# Patient Record
Sex: MALE | Race: OTHER | HISPANIC OR LATINO | URBAN - METROPOLITAN AREA
[De-identification: names, ages, dates, MRNs, and addresses within clinical notes are randomized per-mention and may not be internally consistent; named-entity substitution may affect disease eponyms.]

---

## 2019-07-11 ENCOUNTER — EMERGENCY (EMERGENCY)
Facility: HOSPITAL | Age: 24
LOS: 1 days | Discharge: HOME | End: 2019-07-11
Attending: EMERGENCY MEDICINE
Payer: COMMERCIAL

## 2019-07-11 VITALS
RESPIRATION RATE: 18 BRPM | DIASTOLIC BLOOD PRESSURE: 81 MMHG | OXYGEN SATURATION: 99 % | HEART RATE: 71 BPM | SYSTOLIC BLOOD PRESSURE: 134 MMHG

## 2019-07-11 VITALS
WEIGHT: 182.1 LBS | SYSTOLIC BLOOD PRESSURE: 166 MMHG | DIASTOLIC BLOOD PRESSURE: 111 MMHG | RESPIRATION RATE: 18 BRPM | HEIGHT: 67 IN | HEART RATE: 77 BPM

## 2019-07-11 LAB
ALBUMIN SERPL ELPH-MCNC: 4.6 G/DL — SIGNIFICANT CHANGE UP (ref 3.3–5.2)
ALP SERPL-CCNC: 82 U/L — SIGNIFICANT CHANGE UP (ref 40–120)
ALT FLD-CCNC: 97 U/L — HIGH
ANION GAP SERPL CALC-SCNC: 14 MMOL/L — SIGNIFICANT CHANGE UP (ref 5–17)
AST SERPL-CCNC: 45 U/L — HIGH
BASOPHILS # BLD AUTO: 0.1 K/UL — SIGNIFICANT CHANGE UP (ref 0–0.2)
BASOPHILS NFR BLD AUTO: 1.3 % — SIGNIFICANT CHANGE UP (ref 0–2)
BILIRUB SERPL-MCNC: 0.3 MG/DL — LOW (ref 0.4–2)
BLD GP AB SCN SERPL QL: SIGNIFICANT CHANGE UP
BUN SERPL-MCNC: 16 MG/DL — SIGNIFICANT CHANGE UP (ref 8–20)
CALCIUM SERPL-MCNC: 9.5 MG/DL — SIGNIFICANT CHANGE UP (ref 8.6–10.2)
CHLORIDE SERPL-SCNC: 102 MMOL/L — SIGNIFICANT CHANGE UP (ref 98–107)
CO2 SERPL-SCNC: 23 MMOL/L — SIGNIFICANT CHANGE UP (ref 22–29)
CREAT SERPL-MCNC: 0.64 MG/DL — SIGNIFICANT CHANGE UP (ref 0.5–1.3)
EOSINOPHIL # BLD AUTO: 0.17 K/UL — SIGNIFICANT CHANGE UP (ref 0–0.5)
EOSINOPHIL NFR BLD AUTO: 2.2 % — SIGNIFICANT CHANGE UP (ref 0–6)
GLUCOSE SERPL-MCNC: 96 MG/DL — SIGNIFICANT CHANGE UP (ref 70–115)
HCT VFR BLD CALC: 44.7 % — SIGNIFICANT CHANGE UP (ref 39–50)
HGB BLD-MCNC: 14.7 G/DL — SIGNIFICANT CHANGE UP (ref 13–17)
IMM GRANULOCYTES NFR BLD AUTO: 0.4 % — SIGNIFICANT CHANGE UP (ref 0–1.5)
LYMPHOCYTES # BLD AUTO: 2.66 K/UL — SIGNIFICANT CHANGE UP (ref 1–3.3)
LYMPHOCYTES # BLD AUTO: 35.1 % — SIGNIFICANT CHANGE UP (ref 13–44)
MCHC RBC-ENTMCNC: 27.2 PG — SIGNIFICANT CHANGE UP (ref 27–34)
MCHC RBC-ENTMCNC: 32.9 GM/DL — SIGNIFICANT CHANGE UP (ref 32–36)
MCV RBC AUTO: 82.6 FL — SIGNIFICANT CHANGE UP (ref 80–100)
MONOCYTES # BLD AUTO: 0.66 K/UL — SIGNIFICANT CHANGE UP (ref 0–0.9)
MONOCYTES NFR BLD AUTO: 8.7 % — SIGNIFICANT CHANGE UP (ref 2–14)
NEUTROPHILS # BLD AUTO: 3.95 K/UL — SIGNIFICANT CHANGE UP (ref 1.8–7.4)
NEUTROPHILS NFR BLD AUTO: 52.3 % — SIGNIFICANT CHANGE UP (ref 43–77)
PLATELET # BLD AUTO: 262 K/UL — SIGNIFICANT CHANGE UP (ref 150–400)
POTASSIUM SERPL-MCNC: 3.7 MMOL/L — SIGNIFICANT CHANGE UP (ref 3.5–5.3)
POTASSIUM SERPL-SCNC: 3.7 MMOL/L — SIGNIFICANT CHANGE UP (ref 3.5–5.3)
PROT SERPL-MCNC: 7.8 G/DL — SIGNIFICANT CHANGE UP (ref 6.6–8.7)
RBC # BLD: 5.41 M/UL — SIGNIFICANT CHANGE UP (ref 4.2–5.8)
RBC # FLD: 13.2 % — SIGNIFICANT CHANGE UP (ref 10.3–14.5)
SODIUM SERPL-SCNC: 139 MMOL/L — SIGNIFICANT CHANGE UP (ref 135–145)
WBC # BLD: 7.57 K/UL — SIGNIFICANT CHANGE UP (ref 3.8–10.5)
WBC # FLD AUTO: 7.57 K/UL — SIGNIFICANT CHANGE UP (ref 3.8–10.5)

## 2019-07-11 PROCEDURE — 86901 BLOOD TYPING SEROLOGIC RH(D): CPT

## 2019-07-11 PROCEDURE — 80053 COMPREHEN METABOLIC PANEL: CPT

## 2019-07-11 PROCEDURE — 73140 X-RAY EXAM OF FINGER(S): CPT

## 2019-07-11 PROCEDURE — 73140 X-RAY EXAM OF FINGER(S): CPT | Mod: 26,RT

## 2019-07-11 PROCEDURE — 85730 THROMBOPLASTIN TIME PARTIAL: CPT

## 2019-07-11 PROCEDURE — T1013: CPT

## 2019-07-11 PROCEDURE — 96365 THER/PROPH/DIAG IV INF INIT: CPT

## 2019-07-11 PROCEDURE — 36415 COLL VENOUS BLD VENIPUNCTURE: CPT

## 2019-07-11 PROCEDURE — 86850 RBC ANTIBODY SCREEN: CPT

## 2019-07-11 PROCEDURE — 86900 BLOOD TYPING SEROLOGIC ABO: CPT

## 2019-07-11 PROCEDURE — 85610 PROTHROMBIN TIME: CPT

## 2019-07-11 PROCEDURE — 99284 EMERGENCY DEPT VISIT MOD MDM: CPT | Mod: 25

## 2019-07-11 PROCEDURE — 64450 NJX AA&/STRD OTHER PN/BRANCH: CPT

## 2019-07-11 PROCEDURE — 85027 COMPLETE CBC AUTOMATED: CPT

## 2019-07-11 RX ORDER — CEFAZOLIN SODIUM 1 G
1000 VIAL (EA) INJECTION ONCE
Refills: 0 | Status: COMPLETED | OUTPATIENT
Start: 2019-07-11 | End: 2019-07-11

## 2019-07-11 RX ORDER — CEPHALEXIN 500 MG
1 CAPSULE ORAL
Qty: 40 | Refills: 0
Start: 2019-07-11 | End: 2019-07-20

## 2019-07-11 RX ADMIN — Medication 1000 MILLIGRAM(S): at 12:03

## 2019-07-11 RX ADMIN — Medication 100 MILLIGRAM(S): at 11:33

## 2019-07-11 NOTE — ED ADULT NURSE NOTE - OBJECTIVE STATEMENT
patient states he was cleaning a machine at work and the blade cut his right second and third fingers. visualized subcutaneous fat and soft tissue exposed.

## 2019-07-11 NOTE — ED PROCEDURE NOTE - CPROC ED POST PROC CARE GUIDE1
Elevate the injured extremity as instructed./Instructed patient/caregiver to follow-up with primary care physician./Instructed patient/caregiver regarding signs and symptoms of infection./Verbal/written post procedure instructions were given to patient/caregiver.

## 2019-07-11 NOTE — ED PROVIDER NOTE - CRITICAL CARE INDICATION, MLM
patient was critically ill... Patient was critically ill with a high probability of imminent or life threatening deterioration. Concern for loss of limb

## 2019-07-11 NOTE — ED ADULT TRIAGE NOTE - CHIEF COMPLAINT QUOTE
Pt presents to ED for eval s/p hand injury while at work. Pt states that he was cleaning a machine and the blade sliced his hand. Pt with +avulsion to right hand index and middle finger. Tip of fingers missing, +bone exposure to middle finger. +active bleeding

## 2019-07-11 NOTE — ED PROVIDER NOTE - NSFOLLOWUPINSTRUCTIONS_ED_ALL_ED_FT
- Follow up with Dr Alvarez within 24 hours, call now for an appointment.   - Bring results with you to the appointment.   - Take Keflex 500mg four times a day for 10 days.   - Return to the ED for any new or worsening symptoms.

## 2019-07-11 NOTE — CONSULT NOTE ADULT - SUBJECTIVE AND OBJECTIVE BOX
Pt Name: DAGO VELASCO    MRN: [id number]      Patient is a 24y Male presenting to the emergency department with a chief complaint of right hand 2nd/3rd pain s/p getting hand stuck in machine at work. Pt works as [employer] and was using a cutting device, got both digits stuck in machine. Pt reports being able to move the digits and having sensation s/p injury. Pt reports having pain after the injury. Pt received digital block in the ED with minimal irrigation, was given IV ABX and tetanus is up to date. Pt reports no other orthopedic injuries, only digits 2 and 3 affected on the right hand.       REVIEW OF SYSTEMS    General: Alert, responsive, in NAD    Skin/Breast: No rashes, no pruritis   	  Ophthalmologic: No visual changes. No redness.   	  ENMT:	No discharge. No swelling.    Respiratory and Thorax: No difficulty breathing. No cough.  	   Cardiovascular:	No chest pain. No palpitations.    Gastrointestinal:	 No abdominal pain. No diarrhea.     Genitourinary: No dysuria. No bleeding.    Musculoskeletal: SEE HPI.    Neurological: No sensory or motor changes.     Psychiatric: No anxiety or depression.    Hematology/Lymphatics: No swelling.    Endocrine: No Hx of diabetes.    ROS is otherwise negative.    PAST MEDICAL & SURGICAL HISTORY:  PAST MEDICAL & SURGICAL HISTORY:  No pertinent past medical history  No significant past surgical history      Allergies: No Known Allergies      Medications:     FAMILY HISTORY:  : non-contributory    Social History:     Ambulation: Walking independently [ ] With Cane [ ] With Walker [ ]  Bedbound [ ]                           14.7   7.57  )-----------( 262      ( 11 Jul 2019 11:20 )             44.7       07-11    139  |  102  |  16.0  ----------------------------<  96  3.7   |  23.0  |  0.64    Ca    9.5      11 Jul 2019 11:20    TPro  7.8  /  Alb  4.6  /  TBili  0.3<L>  /  DBili  x   /  AST  45<H>  /  ALT  97<H>  /  AlkPhos  82  07-11      Vital Signs Last 24 Hrs  T(C): 36.8 (11 Jul 2019 11:02), Max: 36.8 (11 Jul 2019 11:02)  T(F): 98.2 (11 Jul 2019 11:02), Max: 98.2 (11 Jul 2019 11:02)  HR: 60 (11 Jul 2019 11:10) (60 - 77)  BP: 120/79 (11 Jul 2019 11:10) (120/79 - 166/111)  BP(mean): --  RR: 18 (11 Jul 2019 11:00) (18 - 18)  SpO2: --    Daily Height in cm: 170.18 (11 Jul 2019 11:00)    Daily       PHYSICAL EXAM:      Appearance: Alert, responsive, in no acute distress.  Neurological: Sensation is grossly intact to light touch. Patient recieved digital block in ED prior to ortho consult  Skin: no rash on visible skin. Skin is clean, dry and intact. No bleeding. No abrasions. No ulcerations.  Vascular: 2+ distal pulses, no cyanosis  Right Upper Extremity: right 2nd digit volar aspect distal phalanx diffuse soft tissue loss, no tendon or bony exposure, nail intact. 3rd digit laceration over volar aspect distal phalanx, no bony or tendon exposure. no foreign bodies noted in wounds. minimal active bleeding at 2nd/3rd digit. Full flexion/extension at all joints of 2nd/3rd digits. Rest of digits unaffected.     Imaging Studies:    Right Hand XRAYs- no obvious fracture of 2nd/3rd digits    A/P:  Pt is a  24y Male with  found to have right hand 2nd 3rd digit lacerations    PLAN:   * Washed out in ED with copious irrigation, sterile water and betadine  * Tetanus  * IV ABX  * PO Keflex x 7 days  * F/U this week  * DAYSI SURESH

## 2019-07-11 NOTE — ED PROVIDER NOTE - OBJECTIVE STATEMENT
Translation provided by ED : Jean Marie  24M no PMH p/w injury to R 2nd and 3rd digit. Was trying to remove something from machine at work. Injury occurred ~30min PTA. Pt is R handed. Last tetanus a year ago. Denies additional injuries.

## 2019-07-11 NOTE — ED ADULT NURSE NOTE - NSIMPLEMENTINTERV_GEN_ALL_ED
Implemented All Universal Safety Interventions:  [place] to call system. Call bell, personal items and telephone within reach. Instruct patient to call for assistance. Room bathroom lighting operational. Non-slip footwear when patient is off stretcher. Physically safe environment: no spills, clutter or unnecessary equipment. Stretcher in lowest position, wheels locked, appropriate side rails in place.

## 2019-07-11 NOTE — ED PROVIDER NOTE - PROGRESS NOTE DETAILS
Brianne BADILLO: Digital block to both digits, pt now pain free. Ortho/hand consulted. Brianne BADILLO: seen by ortho, no fx, washed out fingers. Ready for dc on keflex and outpt f/u with Dr Yoon

## 2019-07-11 NOTE — ED PROVIDER NOTE - PHYSICAL EXAMINATION
Gen: Well appearing in mild distress 2/2 pain  Head: NC/AT  Neck: trachea midline  Resp:  No distress, CTAB  CV: RRR  Ext: Distal R 2nd and 3rd digit with multiple lacerations, fat exposed, can probe to bone. Is bleeding, no pulsatile flow. Good sensation and cap refill.   Neuro:  A&O appears non focal  Skin:  Warm and dry as visualized  Psych:  Normal affect and mood

## 2021-03-01 ENCOUNTER — EMERGENCY (EMERGENCY)
Facility: HOSPITAL | Age: 26
LOS: 1 days | Discharge: HOME | End: 2021-03-01
Attending: STUDENT IN AN ORGANIZED HEALTH CARE EDUCATION/TRAINING PROGRAM
Payer: SELF-PAY

## 2021-03-01 VITALS
SYSTOLIC BLOOD PRESSURE: 146 MMHG | RESPIRATION RATE: 18 BRPM | WEIGHT: 179.9 LBS | HEART RATE: 111 BPM | DIASTOLIC BLOOD PRESSURE: 103 MMHG | TEMPERATURE: 98 F | OXYGEN SATURATION: 99 % | HEIGHT: 67 IN

## 2021-03-01 PROBLEM — Z78.9 OTHER SPECIFIED HEALTH STATUS: Chronic | Status: ACTIVE | Noted: 2019-07-11

## 2021-03-01 PROCEDURE — 11740 EVACUATION SUBUNGUAL HMTMA: CPT

## 2021-03-01 PROCEDURE — 29130 APPL FINGER SPLINT STATIC: CPT | Mod: F2,XU

## 2021-03-01 PROCEDURE — 73130 X-RAY EXAM OF HAND: CPT | Mod: 26,LT

## 2021-03-01 PROCEDURE — 73130 X-RAY EXAM OF HAND: CPT

## 2021-03-01 PROCEDURE — 99283 EMERGENCY DEPT VISIT LOW MDM: CPT | Mod: 25

## 2021-03-01 PROCEDURE — 11740 EVACUATION SUBUNGUAL HMTMA: CPT | Mod: F2

## 2021-03-01 RX ORDER — OXYCODONE HYDROCHLORIDE 5 MG/1
5 TABLET ORAL ONCE
Refills: 0 | Status: DISCONTINUED | OUTPATIENT
Start: 2021-03-01 | End: 2021-03-01

## 2021-03-01 RX ORDER — CEPHALEXIN 500 MG
1 CAPSULE ORAL
Qty: 20 | Refills: 0
Start: 2021-03-01 | End: 2021-03-10

## 2021-03-01 RX ORDER — CEPHALEXIN 500 MG
500 CAPSULE ORAL ONCE
Refills: 0 | Status: COMPLETED | OUTPATIENT
Start: 2021-03-01 | End: 2021-03-01

## 2021-03-01 RX ADMIN — OXYCODONE HYDROCHLORIDE 5 MILLIGRAM(S): 5 TABLET ORAL at 16:51

## 2021-03-01 RX ADMIN — Medication 500 MILLIGRAM(S): at 16:52

## 2021-03-01 NOTE — ED PROVIDER NOTE - PHYSICAL EXAMINATION
left hand   positive swelling to distal 3rd finger   positive tenderness to distal finger   positive subungual hemorrhage, ROM decreased 2nd to pain

## 2021-03-01 NOTE — ED PROCEDURE NOTE - CPROC ED TIME OUT STATEMENT1
“Patient's name, , procedure and correct site were confirmed during the [facility] Timeout.”
“Patient's name, , procedure and correct site were confirmed during the [facility] Timeout.”

## 2021-03-01 NOTE — ED PROVIDER NOTE - PRINCIPAL DIAGNOSIS
Finger injury, left, initial encounter Closed displaced fracture of distal phalanx of left middle finger, initial encounter

## 2021-03-01 NOTE — ED PROVIDER NOTE - CCCP TRG CHIEF CMPLNT
Decrease Metoprolol Succinate to 12.5 mg daily (Take half of a 25 mg tablet) in afternoon/evening    Continue other medications finger pain/injury

## 2021-03-01 NOTE — ED PROVIDER NOTE - CARE PLAN
Principal Discharge DX:	Finger injury, left, initial encounter   Principal Discharge DX:	Closed displaced fracture of distal phalanx of left middle finger, initial encounter

## 2021-03-01 NOTE — ED PROVIDER NOTE - OBJECTIVE STATEMENT
Patient is a 26 year old male with left 3rd finger pain s/p having finger crushed at work in machine.  Last TD UTD.  no numbness, no pain to hand

## 2021-03-01 NOTE — ED PROVIDER NOTE - ATTENDING CONTRIBUTION TO CARE
26 year old male with left 3rd finger pain s/p having finger crushed at work in machine just prior to arrival here. Last tetanus up to date. Right hand dominant.  AP - subungal hematoma will need drainage. update tetanus. distal tuft fx. splint, abx and hand f/u

## 2021-03-01 NOTE — ED PROVIDER NOTE - CARE PROVIDER_API CALL
Pretty Pagan)  Orthopaedic Surgery  [phone] [place] [address], [address]  [place] [address]  Phone: [phone]  Fax: [phone]  Follow Up Time:

## 2021-03-01 NOTE — ED PROVIDER NOTE - PATIENT PORTAL LINK FT
You can access the FollowMyHealth Patient Portal offered by [facility] by registering at the following website: [email or web address]. By joining [employer]’s FollowMyHealth portal, you will also be able to view your health information using other applications (apps) compatible with our system.

## 2021-03-01 NOTE — ED PROVIDER NOTE - CLINICAL SUMMARY MEDICAL DECISION MAKING FREE TEXT BOX
26 year old male with left 3rd finger subungual hemorrhage s/p crush injury   will need xray r/o fx, will trephinate nail

## 2021-03-01 NOTE — ED PROVIDER NOTE - NSFOLLOWUPINSTRUCTIONS_ED_ALL_ED_FT
keep splint on for 1 day   follow up PMD in 48 hours  return to ER If any increase in symptoms keep splint on for 1 day   follow up PMD in 48 hours  return to ER If any increase in symptoms  follow up hand specialist

## 2021-12-21 NOTE — ED ADULT NURSE NOTE - DATE/TIME OF ACCEPTANCE
LOV : 09/16/2021  NOV : 03/21/2022    Provided the patient with 2 bottles/boxes of Xarelto  LOT #: [id number]  Exp :11/23     Called the patient and advised that samples have been placed upfront and are ready for pickup. Patient voiced understanding. 11-Jul-2019 10:58

## 2023-04-29 ENCOUNTER — EMERGENCY (EMERGENCY)
Facility: HOSPITAL | Age: 28
LOS: 1 days | Discharge: HOME | End: 2023-04-29
Attending: STUDENT IN AN ORGANIZED HEALTH CARE EDUCATION/TRAINING PROGRAM
Payer: COMMERCIAL

## 2023-04-29 VITALS
RESPIRATION RATE: 18 BRPM | OXYGEN SATURATION: 98 % | TEMPERATURE: 98 F | HEART RATE: 87 BPM | WEIGHT: 199.96 LBS | SYSTOLIC BLOOD PRESSURE: 139 MMHG | DIASTOLIC BLOOD PRESSURE: 86 MMHG

## 2023-04-29 LAB
ALBUMIN SERPL ELPH-MCNC: 4.3 G/DL — SIGNIFICANT CHANGE UP (ref 3.3–5.2)
ALP SERPL-CCNC: 126 U/L — HIGH (ref 40–120)
ALT FLD-CCNC: 411 U/L — HIGH
ANION GAP SERPL CALC-SCNC: 13 MMOL/L — SIGNIFICANT CHANGE UP (ref 5–17)
APTT BLD: 36 SEC — HIGH (ref 27.5–35.5)
AST SERPL-CCNC: 131 U/L — HIGH
BASOPHILS # BLD AUTO: 0.12 K/UL — SIGNIFICANT CHANGE UP (ref 0–0.2)
BASOPHILS NFR BLD AUTO: 1.3 % — SIGNIFICANT CHANGE UP (ref 0–2)
BILIRUB SERPL-MCNC: <0.2 MG/DL — LOW (ref 0.4–2)
BLD GP AB SCN SERPL QL: SIGNIFICANT CHANGE UP
BUN SERPL-MCNC: 17.2 MG/DL — SIGNIFICANT CHANGE UP (ref 8–20)
CALCIUM SERPL-MCNC: 9.3 MG/DL — SIGNIFICANT CHANGE UP (ref 8.4–10.5)
CHLORIDE SERPL-SCNC: 103 MMOL/L — SIGNIFICANT CHANGE UP (ref 96–108)
CO2 SERPL-SCNC: 27 MMOL/L — SIGNIFICANT CHANGE UP (ref 22–29)
CREAT SERPL-MCNC: 0.6 MG/DL — SIGNIFICANT CHANGE UP (ref 0.5–1.3)
EGFR: 133 ML/MIN/1.73M2 — SIGNIFICANT CHANGE UP
EOSINOPHIL # BLD AUTO: 0.74 K/UL — HIGH (ref 0–0.5)
EOSINOPHIL NFR BLD AUTO: 8.1 % — HIGH (ref 0–6)
GLUCOSE SERPL-MCNC: 98 MG/DL — SIGNIFICANT CHANGE UP (ref 70–99)
HCT VFR BLD CALC: 46 % — SIGNIFICANT CHANGE UP (ref 39–50)
HGB BLD-MCNC: 15.3 G/DL — SIGNIFICANT CHANGE UP (ref 13–17)
IMM GRANULOCYTES NFR BLD AUTO: 0.2 % — SIGNIFICANT CHANGE UP (ref 0–0.9)
INR BLD: 1.14 RATIO — SIGNIFICANT CHANGE UP (ref 0.88–1.16)
LACTATE SERPL-SCNC: 1.2 MMOL/L — SIGNIFICANT CHANGE UP (ref 0.5–2)
LIDOCAIN IGE QN: 44 U/L — SIGNIFICANT CHANGE UP (ref 22–51)
LYMPHOCYTES # BLD AUTO: 3.53 K/UL — HIGH (ref 1–3.3)
LYMPHOCYTES # BLD AUTO: 38.6 % — SIGNIFICANT CHANGE UP (ref 13–44)
MCHC RBC-ENTMCNC: 27.5 PG — SIGNIFICANT CHANGE UP (ref 27–34)
MCHC RBC-ENTMCNC: 33.3 GM/DL — SIGNIFICANT CHANGE UP (ref 32–36)
MCV RBC AUTO: 82.6 FL — SIGNIFICANT CHANGE UP (ref 80–100)
MONOCYTES # BLD AUTO: 0.78 K/UL — SIGNIFICANT CHANGE UP (ref 0–0.9)
MONOCYTES NFR BLD AUTO: 8.5 % — SIGNIFICANT CHANGE UP (ref 2–14)
NEUTROPHILS # BLD AUTO: 3.95 K/UL — SIGNIFICANT CHANGE UP (ref 1.8–7.4)
NEUTROPHILS NFR BLD AUTO: 43.3 % — SIGNIFICANT CHANGE UP (ref 43–77)
PLATELET # BLD AUTO: 270 K/UL — SIGNIFICANT CHANGE UP (ref 150–400)
POTASSIUM SERPL-MCNC: 3.9 MMOL/L — SIGNIFICANT CHANGE UP (ref 3.5–5.3)
POTASSIUM SERPL-SCNC: 3.9 MMOL/L — SIGNIFICANT CHANGE UP (ref 3.5–5.3)
PROT SERPL-MCNC: 7.1 G/DL — SIGNIFICANT CHANGE UP (ref 6.6–8.7)
PROTHROM AB SERPL-ACNC: 13.3 SEC — SIGNIFICANT CHANGE UP (ref 10.5–13.4)
RBC # BLD: 5.57 M/UL — SIGNIFICANT CHANGE UP (ref 4.2–5.8)
RBC # FLD: 12.9 % — SIGNIFICANT CHANGE UP (ref 10.3–14.5)
SODIUM SERPL-SCNC: 143 MMOL/L — SIGNIFICANT CHANGE UP (ref 135–145)
WBC # BLD: 9.14 K/UL — SIGNIFICANT CHANGE UP (ref 3.8–10.5)
WBC # FLD AUTO: 9.14 K/UL — SIGNIFICANT CHANGE UP (ref 3.8–10.5)

## 2023-04-29 PROCEDURE — 96374 THER/PROPH/DIAG INJ IV PUSH: CPT | Mod: XU

## 2023-04-29 PROCEDURE — 96361 HYDRATE IV INFUSION ADD-ON: CPT

## 2023-04-29 PROCEDURE — T1013: CPT

## 2023-04-29 PROCEDURE — 86900 BLOOD TYPING SEROLOGIC ABO: CPT

## 2023-04-29 PROCEDURE — 85730 THROMBOPLASTIN TIME PARTIAL: CPT

## 2023-04-29 PROCEDURE — 85025 COMPLETE CBC W/AUTO DIFF WBC: CPT

## 2023-04-29 PROCEDURE — 99284 EMERGENCY DEPT VISIT MOD MDM: CPT | Mod: 25

## 2023-04-29 PROCEDURE — 74177 CT ABD & PELVIS W/CONTRAST: CPT | Mod: MA

## 2023-04-29 PROCEDURE — 83690 ASSAY OF LIPASE: CPT

## 2023-04-29 PROCEDURE — 80053 COMPREHEN METABOLIC PANEL: CPT

## 2023-04-29 PROCEDURE — 74177 CT ABD & PELVIS W/CONTRAST: CPT | Mod: 26,MA

## 2023-04-29 PROCEDURE — 99285 EMERGENCY DEPT VISIT HI MDM: CPT

## 2023-04-29 PROCEDURE — 83605 ASSAY OF LACTIC ACID: CPT

## 2023-04-29 PROCEDURE — 85610 PROTHROMBIN TIME: CPT

## 2023-04-29 PROCEDURE — 86850 RBC ANTIBODY SCREEN: CPT

## 2023-04-29 PROCEDURE — 86901 BLOOD TYPING SEROLOGIC RH(D): CPT

## 2023-04-29 PROCEDURE — 36415 COLL VENOUS BLD VENIPUNCTURE: CPT

## 2023-04-29 RX ORDER — KETOROLAC TROMETHAMINE 30 MG/ML
15 SYRINGE (ML) INJECTION ONCE
Refills: 0 | Status: DISCONTINUED | OUTPATIENT
Start: 2023-04-29 | End: 2023-04-29

## 2023-04-29 RX ORDER — SODIUM CHLORIDE 9 MG/ML
1000 INJECTION, SOLUTION INTRAVENOUS ONCE
Refills: 0 | Status: COMPLETED | OUTPATIENT
Start: 2023-04-29 | End: 2023-04-29

## 2023-04-29 RX ADMIN — SODIUM CHLORIDE 1000 MILLILITER(S): 9 INJECTION, SOLUTION INTRAVENOUS at 19:30

## 2023-04-29 RX ADMIN — Medication 15 MILLIGRAM(S): at 19:30

## 2023-04-29 NOTE — ED STATDOCS - PHYSICAL EXAMINATION
Const: Awake, alert and oriented. In no acute distress. Well appearing.  HEENT: NC/AT. Moist mucous membranes.  Eyes: No scleral icterus. EOMI.  Neck:. Soft and supple. Full ROM without pain.  Cardiac: Regular rate and regular rhythm. +S1/S2. Peripheral pulses 2+ and symmetric. No LE edema.  Resp: Speaking in full sentences. No evidence of respiratory distress. No wheezes, rales or rhonchi.  Abd: Soft, partially reducible umbilical hernia, and tender to palpation. non-distended. Normal bowel sounds in all 4 quadrants. No guarding or rebound.  Back: Spine midline and non-tender. No CVAT.  Skin: No rashes, abrasions or lacerations.  Lymph: No cervical lymphadenopathy.  Neuro: Awake, alert & oriented x 3. Moves all extremities symmetrically.

## 2023-04-29 NOTE — ED STATDOCS - CARE PROVIDER_API CALL
Jaqueline Brown (DO)  Gastroenterology  [address], [address]  [place] [address]  Phone: [phone]  Fax: [phone]  Follow Up Time:

## 2023-04-29 NOTE — ED ADULT NURSE NOTE - OBJECTIVE STATEMENT
Pt presents to ED c/o abdominal pain. Pt states he has had pain for past 4 months which worsened last night. PMH appendectomy and reports developing an umbilical hernia after the surgery. Denies n/v/d, fever, chills, or urinary symptoms. Pt awaiting CT scan at this time.

## 2023-04-29 NOTE — ED STATDOCS - OBJECTIVE STATEMENT
29 y/o male with pmhx of appendectomy and umbilical hernia, c/o abdominal pain x4 months with worsening pain last night. States since appendectomy years ago developed umbilical hernia but pain hasn't started until 4 months ago. Pt hasn't gone to see surgery. Last BM was this morning. No allergies. Denies talking any medication PTA.

## 2023-04-29 NOTE — ED STATDOCS - NSFOLLOWUPINSTRUCTIONS_ED_ALL_ED_FT
1) Cristhian un seguimiento con [facility] médico de atención primaria en los próximos 5-7 días. Llame mañana para concertar rekha yobani. Si no puede hacer un seguimiento con [facility] médico de atención primaria, regrese al servicio de urgencias por cualquier problema urgente.  2) Se le entregó rekha copia de las pruebas realizadas hoy. Traiga los resultados y revíselos con pear[facility] médico de atención primaria.  3) Si tiene algún empeoramiento de los síntomas o cualquier otra inquietud, regrese al servicio de urgencias de inmediato.  4) Continúe tomando jed medicamentos caseros según las indicaciones.

## 2023-04-29 NOTE — ED STATDOCS - CLINICAL SUMMARY MEDICAL DECISION MAKING FREE TEXT BOX
Concerned incarcerated/strangulated umbilical hernia. Surgery called immediately, labs, CT scan and pain medication. Concerned incarcerated/strangulated umbilical hernia. Surgery called immediately, labs, CT scan and pain medication.      Pt with umbilical hernia, reduced by Surgery, ct shows fat containing hernia, f/u with general surgery, labs stable except for elevated liver enzymes, f/u with gastro, Pt reassessed, pt feeling better at this time, vss, pt able to walk, talk and vocalized plan of action. Discussed in depth and explained to pt in depth the next steps that need to be taking including proper follow up with PCP or specialists. All incidental findings were discussed with pt as well. Pt verbalized their concerns and all questions were answered. Pt understands dispo and wants discharge. Given good instructions when to return to ED and importance of f/u.

## 2023-04-29 NOTE — CONSULT NOTE ADULT - ATTENDING COMMENTS
27 yo M with known umbilical hernia who presents to ED with a one day hx of umbilical pain, reducible hernia. Imaging demonstrates a multilobular fat containing umbilical hernia with a wide neck 1.3 cm, no evidence of obstruction. Hernia reducible at bedside. WBC nl. Elevated LFTs , no RUQ tenderness and/or evidence of gallstones   Patient referred to surgery clinic to schedule elective umbilical hernia repair

## 2023-04-29 NOTE — CONSULT NOTE ADULT - SUBJECTIVE AND OBJECTIVE BOX
Surgery Consult    Consulting attending: Dr. Tang       HPI: Mr. Montalvo is a 27 yo M with a known umbilical hernia (patient has noticed for one year after a laparoscopic appendectomy) who presents to the ED with a one- two day hx of umbilical pain. He denies any N&V,  obstructive complaints, changes in bowel habits, and/or fever/chills. CT A/P demonstrates         PAST MEDICAL HISTORY:  No pertinent past medical history          PAST SURGICAL HISTORY:  No significant past surgical history          MEDICATIONS:  ketorolac   Injectable 15 milliGRAM(s) IV Push once  lactated ringers Bolus 1000 milliLiter(s) IV Bolus once        ALLERGIES:  No Known Allergies        VITALS & I/Os:  Vital Signs Last 24 Hrs  T(C): 36.9 (29 Apr 2023 15:53), Max: 36.9 (29 Apr 2023 15:53)  T(F): 98.4 (29 Apr 2023 15:53), Max: 98.4 (29 Apr 2023 15:53)  HR: 87 (29 Apr 2023 15:53) (87 - 87)  BP: 139/86 (29 Apr 2023 15:53) (139/86 - 139/86)  BP(mean): --  RR: 18 (29 Apr 2023 15:53) (18 - 18)  SpO2: 98% (29 Apr 2023 15:53) (98% - 98%)    Parameters below as of 29 Apr 2023 15:53  Patient On (Oxygen Delivery Method): room air        I&O's Summary        PHYSICAL EXAM:  Constitutional: patient resting comfortably in bed, in no acute distress  Respiratory: respirations are unlabored, no accessory muscle use, no conversational dyspnea  Cardiovascular: regular rate & rhythm  Gastrointestinal: Abdomen soft,  tender in umbilicus, reducible hernia through small defect, no overlying skin changes,  non-distended, no rebound tenderness / guarding  Neurological:  A&O x 3; no gross sensory / motor / coordination deficits  Psychiatric: Normal mood, normal affect  Musculoskeletal: No joint pain, swelling or deformity; no limitation of movement          LABS:          Lactate:                    IMAGING:                                                                                Surgery Consult    Consulting attending: Dr. Tang       HPI: Mr. Montalvo is a 27 yo M with a known umbilical hernia (patient has noticed for one year after a laparoscopic appendectomy) who presents to the ED with a one- two day hx of umbilical pain. He denies any N&V,  obstructive complaints, changes in bowel habits, and/or fever/chills. Patient does endorse heavy lifting at work.  CT A/P demonstrates a multilobular fat containing umbilical hernia with a wide neck 1.3 cm, no evidence of obstruction. Patient hemodynamically well.          PAST MEDICAL HISTORY:  No pertinent past medical history          PAST SURGICAL HISTORY:  No significant past surgical history          MEDICATIONS:  ketorolac   Injectable 15 milliGRAM(s) IV Push once  lactated ringers Bolus 1000 milliLiter(s) IV Bolus once        ALLERGIES:  No Known Allergies        VITALS & I/Os:  Vital Signs Last 24 Hrs  T(C): 36.9 (29 Apr 2023 15:53), Max: 36.9 (29 Apr 2023 15:53)  T(F): 98.4 (29 Apr 2023 15:53), Max: 98.4 (29 Apr 2023 15:53)  HR: 87 (29 Apr 2023 15:53) (87 - 87)  BP: 139/86 (29 Apr 2023 15:53) (139/86 - 139/86)  BP(mean): --  RR: 18 (29 Apr 2023 15:53) (18 - 18)  SpO2: 98% (29 Apr 2023 15:53) (98% - 98%)    Parameters below as of 29 Apr 2023 15:53  Patient On (Oxygen Delivery Method): room air        I&O's Summary        PHYSICAL EXAM:  Constitutional: patient resting comfortably in bed, in no acute distress  Respiratory: respirations are unlabored, no accessory muscle use, no conversational dyspnea  Cardiovascular: regular rate & rhythm  Gastrointestinal: Abdomen soft,  tender in umbilicus, reducible hernia through small defect, no overlying skin changes,  non-distended, no rebound tenderness / guarding  Neurological:  A&O x 3; no gross sensory / motor / coordination deficits  Psychiatric: Normal mood, normal affect  Musculoskeletal: No joint pain, swelling or deformity; no limitation of movement          LABS:                        15.3   9.14  )-----------( 270      ( 29 Apr 2023 19:30 )             46.0   04-29    143  |  103  |  17.2  ----------------------------<  98  3.9   |  27.0  |  0.60    Ca    9.3      29 Apr 2023 19:30    TPro  7.1  /  Alb  4.3  /  TBili  <0.2<L>  /  DBili  x   /  AST  131<H>  /  ALT  411<H>  /  AlkPhos  126<H>  04-29          Lactate:    1.2                   IMAGING:  < from: CT Abdomen and Pelvis w/ IV Cont (04.29.23 @ 21:59) >  IMPRESSION:  Approximately 3.5 x 3.5 cm bilobed, multicompartmented fat-containing   umbilical hernia, with an approximately 1.3 cm wide neck    No CT evidence of bowel obstruction, active inflammatory process or   intra-abdominal source for infection.

## 2023-04-29 NOTE — CONSULT NOTE ADULT - ASSESSMENT
A: 27 yo M with known umbilical hernia who presents to ED with a one day hx of umbilical pain, reducible hernia. Imaging demonstrates     Plan:   -  A: 29 yo M with known umbilical hernia who presents to ED with a one day hx of umbilical pain, reducible hernia. Imaging demonstrates a multilobular fat containing umbilical hernia with a wide neck 1.3 cm, no evidence of obstruction. Hernia reducible at bedside. WBC nl. Elevated LFTs , no RUQ tenderness and/or evidence of gallstones     Plan:   - No acute surgical intervention   - Follow up outpatient with [facility] Surgery for elective umbilical hernia repair   - Recommend follow up with PCP for transaminase    Plan discussed with on call Surgery Attending

## 2023-04-29 NOTE — ED STATDOCS - NSFOLLOWUPCLINICS_GEN_ALL_ED_FT
[facility] Acute Care Surgery  Acute Care Surgery  [address] [place] [id number]  Phone: [phone]  Fax:

## 2023-04-29 NOTE — ED STATDOCS - PATIENT PORTAL LINK FT
You can access the FollowMyHealth Patient Portal offered by [facility] by registering at the following website: [email or web address]. By joining [employer]’s FollowMyHealth portal, you will also be able to view your health information using other applications (apps) compatible with our system.
